# Patient Record
Sex: MALE | Race: WHITE | NOT HISPANIC OR LATINO | Employment: UNEMPLOYED | URBAN - METROPOLITAN AREA
[De-identification: names, ages, dates, MRNs, and addresses within clinical notes are randomized per-mention and may not be internally consistent; named-entity substitution may affect disease eponyms.]

---

## 2021-05-28 ENCOUNTER — OFFICE VISIT (OUTPATIENT)
Dept: FAMILY MEDICINE | Facility: CLINIC | Age: 21
End: 2021-05-28
Payer: COMMERCIAL

## 2021-05-28 ENCOUNTER — NURSE TRIAGE (OUTPATIENT)
Dept: NURSING | Facility: CLINIC | Age: 21
End: 2021-05-28

## 2021-05-28 VITALS
HEART RATE: 141 BPM | OXYGEN SATURATION: 99 % | DIASTOLIC BLOOD PRESSURE: 74 MMHG | TEMPERATURE: 98.6 F | SYSTOLIC BLOOD PRESSURE: 126 MMHG | WEIGHT: 154 LBS

## 2021-05-28 DIAGNOSIS — Z11.59 NEED FOR HEPATITIS C SCREENING TEST: ICD-10-CM

## 2021-05-28 DIAGNOSIS — R11.2 NON-INTRACTABLE VOMITING WITH NAUSEA, UNSPECIFIED VOMITING TYPE: ICD-10-CM

## 2021-05-28 DIAGNOSIS — R17 JAUNDICE: Primary | ICD-10-CM

## 2021-05-28 DIAGNOSIS — F41.1 GENERALIZED ANXIETY DISORDER: ICD-10-CM

## 2021-05-28 DIAGNOSIS — F19.90 SUBSTANCE USE: ICD-10-CM

## 2021-05-28 DIAGNOSIS — Z11.4 SCREENING FOR HIV (HUMAN IMMUNODEFICIENCY VIRUS): ICD-10-CM

## 2021-05-28 PROBLEM — F34.1: Status: ACTIVE | Noted: 2019-11-20

## 2021-05-28 LAB
ALBUMIN SERPL-MCNC: 4.7 G/DL (ref 3.4–5)
ALBUMIN UR-MCNC: 30 MG/DL
ALP SERPL-CCNC: 101 U/L (ref 65–260)
ALT SERPL W P-5'-P-CCNC: 76 U/L (ref 0–50)
AMPHETAMINES UR QL: NOT DETECTED NG/ML
ANION GAP SERPL CALCULATED.3IONS-SCNC: 9 MMOL/L (ref 3–14)
APPEARANCE UR: CLEAR
AST SERPL W P-5'-P-CCNC: 24 U/L (ref 0–35)
BACTERIA #/AREA URNS HPF: ABNORMAL /HPF
BARBITURATES UR QL SCN: NOT DETECTED NG/ML
BASOPHILS # BLD AUTO: 0 10E9/L (ref 0–0.2)
BASOPHILS NFR BLD AUTO: 0.4 %
BENZODIAZ UR QL SCN: NOT DETECTED NG/ML
BILIRUB DIRECT SERPL-MCNC: 0.3 MG/DL (ref 0–0.2)
BILIRUB SERPL-MCNC: 6 MG/DL (ref 0.2–1.3)
BILIRUB UR QL STRIP: ABNORMAL
BUN SERPL-MCNC: 14 MG/DL (ref 7–30)
BUPRENORPHINE UR QL: NOT DETECTED NG/ML
CALCIUM SERPL-MCNC: 9.8 MG/DL (ref 8.5–10.1)
CANNABINOIDS UR QL: ABNORMAL NG/ML
CHLORIDE SERPL-SCNC: 104 MMOL/L (ref 98–110)
CO2 SERPL-SCNC: 24 MMOL/L (ref 20–32)
COCAINE UR QL SCN: NOT DETECTED NG/ML
COLOR UR AUTO: YELLOW
CREAT SERPL-MCNC: 0.87 MG/DL (ref 0.5–1)
D-METHAMPHET UR QL: NOT DETECTED NG/ML
DIFFERENTIAL METHOD BLD: NORMAL
EOSINOPHIL # BLD AUTO: 0 10E9/L (ref 0–0.7)
EOSINOPHIL NFR BLD AUTO: 0.8 %
ERYTHROCYTE [DISTWIDTH] IN BLOOD BY AUTOMATED COUNT: 11.8 % (ref 10–15)
GFR SERPL CREATININE-BSD FRML MDRD: >90 ML/MIN/{1.73_M2}
GLUCOSE SERPL-MCNC: 107 MG/DL (ref 70–99)
GLUCOSE UR STRIP-MCNC: NEGATIVE MG/DL
HCT VFR BLD AUTO: 46.4 % (ref 40–53)
HGB BLD-MCNC: 16.9 G/DL (ref 13.3–17.7)
HGB UR QL STRIP: NEGATIVE
KETONES UR STRIP-MCNC: 15 MG/DL
LEUKOCYTE ESTERASE UR QL STRIP: NEGATIVE
LYMPHOCYTES # BLD AUTO: 1.5 10E9/L (ref 0.8–5.3)
LYMPHOCYTES NFR BLD AUTO: 30.4 %
MCH RBC QN AUTO: 30.6 PG (ref 26.5–33)
MCHC RBC AUTO-ENTMCNC: 36.4 G/DL (ref 31.5–36.5)
MCV RBC AUTO: 84 FL (ref 78–100)
METHADONE UR QL SCN: NOT DETECTED NG/ML
MONOCYTES # BLD AUTO: 0.6 10E9/L (ref 0–1.3)
MONOCYTES NFR BLD AUTO: 11.1 %
MUCOUS THREADS #/AREA URNS LPF: PRESENT /LPF
NEUTROPHILS # BLD AUTO: 2.9 10E9/L (ref 1.6–8.3)
NEUTROPHILS NFR BLD AUTO: 57.3 %
NITRATE UR QL: NEGATIVE
NON-SQ EPI CELLS #/AREA URNS LPF: ABNORMAL /LPF
OPIATES UR QL SCN: NOT DETECTED NG/ML
OXYCODONE UR QL SCN: NOT DETECTED NG/ML
PCP UR QL SCN: NOT DETECTED NG/ML
PH UR STRIP: 5.5 PH (ref 5–7)
PLATELET # BLD AUTO: 232 10E9/L (ref 150–450)
POTASSIUM SERPL-SCNC: 3.6 MMOL/L (ref 3.4–5.3)
PROPOXYPH UR QL: NOT DETECTED NG/ML
PROT SERPL-MCNC: 8.7 G/DL (ref 6.8–8.8)
RBC # BLD AUTO: 5.53 10E12/L (ref 4.4–5.9)
RBC #/AREA URNS AUTO: ABNORMAL /HPF
SODIUM SERPL-SCNC: 137 MMOL/L (ref 133–144)
SOURCE: ABNORMAL
SP GR UR STRIP: >1.03 (ref 1–1.03)
TRICYCLICS UR QL SCN: NOT DETECTED NG/ML
TSH SERPL DL<=0.005 MIU/L-ACNC: 0.64 MU/L (ref 0.4–4)
UROBILINOGEN UR STRIP-ACNC: 0.2 EU/DL (ref 0.2–1)
WBC # BLD AUTO: 5 10E9/L (ref 4–11)
WBC #/AREA URNS AUTO: ABNORMAL /HPF

## 2021-05-28 PROCEDURE — 36415 COLL VENOUS BLD VENIPUNCTURE: CPT | Performed by: INTERNAL MEDICINE

## 2021-05-28 PROCEDURE — 86803 HEPATITIS C AB TEST: CPT | Performed by: INTERNAL MEDICINE

## 2021-05-28 PROCEDURE — 82248 BILIRUBIN DIRECT: CPT | Performed by: INTERNAL MEDICINE

## 2021-05-28 PROCEDURE — 87389 HIV-1 AG W/HIV-1&-2 AB AG IA: CPT | Performed by: INTERNAL MEDICINE

## 2021-05-28 PROCEDURE — 99204 OFFICE O/P NEW MOD 45 MIN: CPT | Performed by: INTERNAL MEDICINE

## 2021-05-28 PROCEDURE — 86706 HEP B SURFACE ANTIBODY: CPT | Performed by: INTERNAL MEDICINE

## 2021-05-28 PROCEDURE — 86708 HEPATITIS A ANTIBODY: CPT | Performed by: INTERNAL MEDICINE

## 2021-05-28 PROCEDURE — 80306 DRUG TEST PRSMV INSTRMNT: CPT | Performed by: INTERNAL MEDICINE

## 2021-05-28 PROCEDURE — 81001 URINALYSIS AUTO W/SCOPE: CPT | Performed by: INTERNAL MEDICINE

## 2021-05-28 PROCEDURE — 80050 GENERAL HEALTH PANEL: CPT | Performed by: INTERNAL MEDICINE

## 2021-05-28 RX ORDER — ONDANSETRON 4 MG/1
4 TABLET, FILM COATED ORAL EVERY 8 HOURS PRN
Qty: 30 TABLET | Refills: 5 | Status: SHIPPED | OUTPATIENT
Start: 2021-05-28

## 2021-05-28 SDOH — HEALTH STABILITY: MENTAL HEALTH: HOW MANY STANDARD DRINKS CONTAINING ALCOHOL DO YOU HAVE ON A TYPICAL DAY?: NOT ASKED

## 2021-05-28 SDOH — HEALTH STABILITY: MENTAL HEALTH: HOW OFTEN DO YOU HAVE 6 OR MORE DRINKS ON ONE OCCASION?: NOT ASKED

## 2021-05-28 SDOH — HEALTH STABILITY: MENTAL HEALTH: HOW OFTEN DO YOU HAVE A DRINK CONTAINING ALCOHOL?: NOT ASKED

## 2021-05-28 NOTE — PATIENT INSTRUCTIONS
As discucssed, please avoid any substances if you are taking which will act on the liver.     Please do the lab work as discussed.   Mean while consume easily digestible foods, avoids fats and cheese . Hydrate well atleast 2 litres of water/day.   Go to ER for worsening symptoms of confusion and dizziness. Stay around family.     Placed Ultrasound of the abdomen. -   [facility]  ( [facility] )   Call : [phone]  Toll Free : [phone]    ==============================    [facility]  ( [facility] )   [facility]  Call : [phone]  Toll Free : [phone]    ==============================  [facility]  Phone : [phone]    ===============================    [facility]   ( All Locations )   Call : [phone]  Toll Free : [phone]        =======================    Patient Education     Total Bilirubin (Blood)  Does this test have other names?  Total serum bilirubin, TSB   What is this test?  This is a blood test that measures the amount of a substance called bilirubin. This test is used to find out how well your liver is working. It is often part of a panel of tests that measure liver function. A small amount of bilirubin in your blood is normal, but a high level may be a sign of liver disease.   The liver makes bile to help you digest food, and bile contains bilirubin. Most bilirubin comes from the body's normal process of breaking down old red blood cells. A healthy liver can normally get rid of bilirubin. But when you have liver problems, bilirubin can build up in your body to unhealthy levels.    Why do I need this test?  You may need this test if you have symptoms of liver damage or disease. Symptoms include:    Yellowish skin or eyes (jaundice)    Stomach pain    Dark urine    Light colored stool    Flu-like symptoms, such as fever and chills  You may also have your bilirubin level  tested regularly if you are being treated for liver disease.   Many healthy newborns also develop jaundice. Most jaundice in infants causes no problems. But babies are often tested shortly after birth because a high bilirubin level may affect the brain, lead to deafness, and cause intellectual or developmental disabilities..   What other tests might I have along with this test?  You may have other blood tests to find the cause of your liver problems. You may also have urine tests, an ultrasound or other imaging scans of your belly, or a liver biopsy.   For newborns, healthcare providers often order a urine test in addition to the bilirubin test.    What do my test results mean?  Test results may vary depending on your age, gender, health history, the method used for the test, and other things. Your test results may not mean you have a problem. Ask your healthcare provider what your test results mean for you.    Bilirubin results depend on your age, gender, and health. Normal bilirubin levels are generally less than 1 milligram per deciliter (mg/dL). Adults with jaundice generally have bilirubin levels greater than 2.5mg/dL. In an otherwise healthy , bilirubin levels greater than 15 mg/dL may cause problems.   How is the test done?  The test is done with a blood sample. A needle is used to draw blood from a vein in your arm or hand. For a baby, the blood sample is taken from the heel with a small needle stick.   Does this test pose any risks?  Having a blood test with a needle carries some risks. These include bleeding, infection, bruising, and feeling lightheaded. When the needle pricks your arm or hand, you may feel a slight sting or pain. Afterward, the site may be sore.    What might affect my test results?  Medicines and herbal supplements can increase your bilirubin level. Pregnancy and drinking alcohol can also cause a buildup of bilirubin in your liver.    How do I get ready for this test?  Follow your  healthcare provider's instructions about not eating or drinking before the test. Ask your provider if there is anything else you should do to get ready for this test. Tell your provider about all medicines, herbs, vitamins, and supplements you are taking. This includes medicines that don't need a prescription and any illegal drugs you may use.    Regina last reviewed this educational content on 10/1/2020    4275-3891 [employer]. All rights reserved. This information is not intended as a substitute for professional medical care. Always follow your healthcare professional's instructions.    =============

## 2021-05-28 NOTE — TELEPHONE ENCOUNTER
"Pt reports that he woke this morning with jaundice.  His sclera are light yellow, his torso, and legs are yellowish in color as well.  He has been pushing water, and is producing urine, but it is still on the dark side.  Pt denies any abdominal pain or fever.  He vomited once today, and emesis was yellowish in color.  He denies any symptoms of dehydration, or pain. He denies any dizziness or lightheadedness.  Per protocol, pt advised to be evaluated today by a provider.  Pt transferred to scheduling to set up an appointment to be seen today. Pt advised that if there are no available appointments, he should be seen at urgent care today.  Tracee Beasley RN 05/28/21 1:33 PM  [facility] Nurse Advisor      Reason for Disposition    Jaundice    Additional Information    Negative: Unconscious or difficult to awaken    Negative: Acting confused (e.g., disoriented, slurred speech)    Negative: Seizure has occurred    Negative: Has fainted (passed out)    Negative: Very weak (e.g., can't stand)    Negative: Acetaminophen overdose or poisoning suspected    Negative: Sounds like a life-threatening emergency to the triager    Negative: [1] Abdominal pain AND [2] severe    Negative: [1] Constant abdominal pain AND [2] present > 2 hours    Negative: [1] Vomiting AND [2] contains red blood or black (\"coffee ground\") material    Negative: [1] Vomiting AND [2] signs of dehydration (e.g., very dry mouth, lightheaded)    Negative: Fever    Negative: Shaking chills    Negative: [1] Drinking very little AND [2] dehydration suspected (e.g., no urine > 12 hours, very dry mouth, very lightheaded)    Negative: Patient sounds very sick or weak to the triager    Negative: Abdominal pain    Protocols used: [id number]    COVID 19 Nurse Triage Plan/Patient Instructions    Please be aware that novel coronavirus (COVID-19) may be circulating in the community. If you develop symptoms such as fever, cough, or SOB or if you have concerns " about the presence of another infection including coronavirus (COVID-19), please contact your health care provider or visit [email or web address].     Disposition/Instructions    In-Person Visit with provider recommended. Reference Visit Selection Guide.    Thank you for taking steps to prevent the spread of this virus.  o Limit your contact with others.  o Wear a simple mask to cover your cough.  o Wash your hands well and often.    Resources    [facility]: About COVID-19: [email or web address]    CDC: What to Do If You're Sick: www.cdc.gov/coronavirus/2019-ncov/about/steps-when-sick.html    CDC: Ending Home Isolation: www.cdc.gov/coronavirus/2019-ncov/hcp/disposition-in-home-patients.html     CDC: Caring for Someone: www.cdc.gov/coronavirus/2019-ncov/if-you-are-sick/care-for-someone.html     [facility]: Interim Guidance for Hospital Discharge to Home: www.[email or web address]/hcp/hospdischarge.pdf    [facility] clinical trials (COVID-19 research studies): clinicalaffairs.[facility]-clinical-trials     Below are the COVID-19 hotlines at the [place] Department of [facility] ([facility]). Interpreters are available.   o For health questions: Call 919-036-6634 or 1-584.258.7151 (7 a.m. to 7 p.m.)  o For questions about schools and childcare: Call 928-606-9227 or 1-253.430.7495 (7 a.m. to 7 p.m.)

## 2021-05-28 NOTE — PROGRESS NOTES
Assessment and Plan  1. Jaundice  New problem, Unspecified reason. Discussed on all the possible causes which could trigger the symptom of jaundice which pt denies. Though smokes Marijuana. Pt states he is is trying to live by himself away from the family.  - Differential diagnosis of Hepatitis , Substance use versus Unspecified Liver pathology. Will do required labs and imaging at this time and do further  recommendations.   - Comprehensive metabolic panel  - CBC with platelets differential  - Hepatitis B Surface Antibody  - UA with Microscopic reflex to Culture  - Bilirubin direct  - Hepatitis A Antibody IgG  - US Abdomen Complete; Future    2. Non-intractable vomiting with nausea, unspecified vomiting type  - ondansetron (ZOFRAN) 4 MG tablet; Take 1 tablet (4 mg) by mouth every 8 hours as needed for nausea  Dispense: 30 tablet; Refill: 5    3. Substance use  Marijuana and Alcohol. Denies any other drugs on questioning. Pt Ok'ed for UDS.  - Drug Abuse Screen Panel 13, Urine (Care Map)    4. Generalized anxiety disorder  - TSH with free T4 reflex    5. Screening for HIV (human immunodeficiency virus)  - HIV Antigen Antibody Combo    6. Need for hepatitis C screening test  - Hepatitis C Screen Reflex to HCV RNA Quant and Genotype       Patient Instructions   As discucssed, please avoid any substances if you are taking which will act on the liver.     Please do the lab work as discussed.   Mean while consume easily digestible foods, avoids fats and cheese . Hydrate well atleast 2 litres of water/day.   Go to ER for worsening symptoms of confusion and dizziness. Stay around family.     Placed Ultrasound of the abdomen. -   [facility]  ( [facility] )   Call : [phone]  Toll Free : [phone]    ==============================    [facility]  ( [facility] )   [facility]  Call : [phone]  Toll Free : [phone]    ==============================  [facility]  [facility]  Phone : [phone]    ===============================    [facility]   ( All Locations )   Call : [phone]  Toll Free : [phone]        =======================    Patient Education     Total Bilirubin (Blood)  Does this test have other names?  Total serum bilirubin, TSB   What is this test?  This is a blood test that measures the amount of a substance called bilirubin. This test is used to find out how well your liver is working. It is often part of a panel of tests that measure liver function. A small amount of bilirubin in your blood is normal, but a high level may be a sign of liver disease.   The liver makes bile to help you digest food, and bile contains bilirubin. Most bilirubin comes from the body's normal process of breaking down old red blood cells. A healthy liver can normally get rid of bilirubin. But when you have liver problems, bilirubin can build up in your body to unhealthy levels.    Why do I need this test?  You may need this test if you have symptoms of liver damage or disease. Symptoms include:    Yellowish skin or eyes (jaundice)    Stomach pain    Dark urine    Light colored stool    Flu-like symptoms, such as fever and chills  You may also have your bilirubin level tested regularly if you are being treated for liver disease.   Many healthy newborns also develop jaundice. Most jaundice in infants causes no problems. But babies are often tested shortly after birth because a high bilirubin level may affect the brain, lead to deafness, and cause intellectual or developmental disabilities..   What other tests might I have along with this test?  You may have other blood tests to find the cause of your liver problems. You may also have urine tests, an ultrasound or other imaging scans of your belly, or a liver biopsy.   For newborns, healthcare providers often order a urine test in addition to the bilirubin test.    What do my test  results mean?  Test results may vary depending on your age, gender, health history, the method used for the test, and other things. Your test results may not mean you have a problem. Ask your healthcare provider what your test results mean for you.    Bilirubin results depend on your age, gender, and health. Normal bilirubin levels are generally less than 1 milligram per deciliter (mg/dL). Adults with jaundice generally have bilirubin levels greater than 2.5mg/dL. In an otherwise healthy , bilirubin levels greater than 15 mg/dL may cause problems.   How is the test done?  The test is done with a blood sample. A needle is used to draw blood from a vein in your arm or hand. For a baby, the blood sample is taken from the heel with a small needle stick.   Does this test pose any risks?  Having a blood test with a needle carries some risks. These include bleeding, infection, bruising, and feeling lightheaded. When the needle pricks your arm or hand, you may feel a slight sting or pain. Afterward, the site may be sore.    What might affect my test results?  Medicines and herbal supplements can increase your bilirubin level. Pregnancy and drinking alcohol can also cause a buildup of bilirubin in your liver.    How do I get ready for this test?  Follow your healthcare provider's instructions about not eating or drinking before the test. Ask your provider if there is anything else you should do to get ready for this test. Tell your provider about all medicines, herbs, vitamins, and supplements you are taking. This includes medicines that don't need a prescription and any illegal drugs you may use.    [employer] last reviewed this educational content on 10/1/2020    2310-2537 [employer]. All rights reserved. This information is not intended as a substitute for professional medical care. Always follow your healthcare professional's instructions.    =============                   Return in about 4 weeks  (around 6/25/2021), or if symptoms worsen or fail to improve, for Preventative Visit.    Meera Pompa MD  [facility] GERALD Garcia is a 20 year old who presents for the following health issues       HPI     Concern - JAUNDICE  Onset: 2 DAYS  Description: yellow in eyes and skin  Intensity: mild  Progression of Symptoms:  same  Accompanying Signs & Symptoms: hot flashes, dry mouth  Previous history of similar problem: none  Precipitating factors:        Worsened by: none  Alleviating factors:        Improved by: none  Therapies tried and outcome:  none   Did have Vomiting , light yellowish green.   Stool is normal color with midl diarrhea.        Allergies   Allergen Reactions     Cefzil [Cefprozil]      Sulfa Drugs         History reviewed. No pertinent past medical history.    History reviewed. No pertinent surgical history.    History reviewed. No pertinent family history.    Social History     Tobacco Use     Smoking status: Never Smoker     Smokeless tobacco: Never Used   Substance Use Topics     Alcohol use: Not Currently     Comment: Last had 6 weeks.        Current Outpatient Medications   Medication     ondansetron (ZOFRAN) 4 MG tablet     No current facility-administered medications for this visit.         Review of Systems   Constitutional, HEENT, cardiovascular, pulmonary, GI, , musculoskeletal, neuro, skin, endocrine and psych systems are negative, except as otherwise noted.      Objective    /74 (Cuff Size: Adult Regular)   Pulse 141   Temp 98.6  F (37  C) (Tympanic)   Wt 69.9 kg (154 lb)   SpO2 99%   There is no height or weight on file to calculate BMI.  Physical Exam   GENERAL: healthy, alert and no distress  EYES :  POSITIVE for mild Icterus on the Sclera   NECK: no adenopathy, no asymmetry, masses, or scars and thyroid normal to palpation  RESP: lungs clear to auscultation - no rales, rhonchi or wheezes  CV: regular rate and rhythm, normal S1  S2, no S3 or S4, no murmur, click or rub, no peripheral edema and peripheral pulses strong  ABDOMEN: soft, nontender, no hepatosplenomegaly, no masses and bowel sounds normal  MS: no gross musculoskeletal defects noted, no edema, POSITIVE for fine tremors on the outstretched hands which he attributes to anxiety but no flapping tremors.   SKIN : POSITIVE for mild Icterus on skin including torso , Upper arms and face.     Labs and imaging reviewed and discussed with the patient.

## 2021-06-01 LAB
HAV IGG SER QL IA: NONREACTIVE
HBV SURFACE AB SERPL IA-ACNC: 6.78 M[IU]/ML
HCV AB SERPL QL IA: NONREACTIVE
HIV 1+2 AB+HIV1 P24 AG SERPL QL IA: NONREACTIVE

## 2021-06-02 ENCOUNTER — TELEPHONE (OUTPATIENT)
Dept: FAMILY MEDICINE | Facility: CLINIC | Age: 21
End: 2021-06-02

## 2021-06-02 NOTE — TELEPHONE ENCOUNTER
"----- Message from Meera Pompa MD sent at 6/1/2021 11:01 PM CDT -----  Your lab work results for your elevated bilirubin levels is negative for infection as per negative Hepatitis panel. Your Urine randy show Cannabis which is the only cause so far , please quit it completely. Also I still await for your Ultrasound Abdomen results for this \" Unconjugated or Indirect hyperbilirubinemia \" before further recommendations.   Let me know if you have any questions.  Meera Pompa MD on 6/1/2021 at 11:01 PM  "

## 2021-06-02 NOTE — TELEPHONE ENCOUNTER
Attempted to call pt and received message that mailbox has not been set up yet.    Daysi AGUILERA RN  EP Triage

## 2021-06-04 NOTE — TELEPHONE ENCOUNTER
3rd attempt.  Mailbox not set up.    Please mail Dr. Pompa's reply about lab results to roby AGUILERA RN  EP Triage

## 2021-06-11 ENCOUNTER — HOSPITAL ENCOUNTER (OUTPATIENT)
Dept: ULTRASOUND IMAGING | Facility: CLINIC | Age: 21
Discharge: HOME | End: 2021-06-11
Attending: INTERNAL MEDICINE | Admitting: INTERNAL MEDICINE
Payer: COMMERCIAL

## 2021-06-11 ENCOUNTER — TELEPHONE (OUTPATIENT)
Dept: FAMILY MEDICINE | Facility: CLINIC | Age: 21
End: 2021-06-11

## 2021-06-11 DIAGNOSIS — R17 JAUNDICE: Primary | ICD-10-CM

## 2021-06-11 DIAGNOSIS — R17 JAUNDICE: ICD-10-CM

## 2021-06-11 PROCEDURE — 76700 US EXAM ABDOM COMPLETE: CPT

## 2021-06-11 NOTE — TELEPHONE ENCOUNTER
"----- Message from Meera Pompa MD sent at 6/11/2021  1:55 PM CDT -----  Your Ultrasound Abdomen is normal. No concerns.Your symptoms of jaundice are due to substance use. Please quit them all completely. I have placed repeat check of your liver function. Please get lab only appointment for this.    Let me know if you have any questions.  Meera Pompa MD on 6/11/2021 at 1:55 PM    See additional message:   Your lab work results for your elevated bilirubin levels is negative for infection as per negative Hepatitis panel. Your Urine randy show Cannabis which is the only cause so far , please quit it completely. Also I still await for your Ultrasound Abdomen results for this \" Unconjugated or Indirect hyperbilirubinemia \" before further recommendations.   Let me know if you have any questions.  Meera Pompa MD on 6/1/2021 at 11:01 PM    "

## 2021-06-11 NOTE — TELEPHONE ENCOUNTER
Patient Contact    Attempt # 1    Was call answered?  No.  Left message on voicemail with information to call triage back.    On call back:     -Relay lab results below

## 2021-06-14 NOTE — TELEPHONE ENCOUNTER
Pt reviewed ultrasound results and Dr. Pompa's reply via my chart.    Daysi AGUILERA RN  EP Triage

## 2021-06-27 ENCOUNTER — HEALTH MAINTENANCE LETTER (OUTPATIENT)
Age: 21
End: 2021-06-27

## 2021-10-17 ENCOUNTER — HEALTH MAINTENANCE LETTER (OUTPATIENT)
Age: 21
End: 2021-10-17

## 2022-07-24 ENCOUNTER — HEALTH MAINTENANCE LETTER (OUTPATIENT)
Age: 22
End: 2022-07-24

## 2022-10-03 ENCOUNTER — HEALTH MAINTENANCE LETTER (OUTPATIENT)
Age: 22
End: 2022-10-03

## 2023-08-12 ENCOUNTER — HEALTH MAINTENANCE LETTER (OUTPATIENT)
Age: 23
End: 2023-08-12